# Patient Record
Sex: FEMALE | Race: WHITE
[De-identification: names, ages, dates, MRNs, and addresses within clinical notes are randomized per-mention and may not be internally consistent; named-entity substitution may affect disease eponyms.]

---

## 2020-01-06 ENCOUNTER — HOSPITAL ENCOUNTER (EMERGENCY)
Dept: HOSPITAL 62 - ER | Age: 50
Discharge: HOME | End: 2020-01-06
Payer: COMMERCIAL

## 2020-01-06 VITALS — DIASTOLIC BLOOD PRESSURE: 75 MMHG | SYSTOLIC BLOOD PRESSURE: 118 MMHG

## 2020-01-06 DIAGNOSIS — R10.814: ICD-10-CM

## 2020-01-06 DIAGNOSIS — R10.816: ICD-10-CM

## 2020-01-06 DIAGNOSIS — R10.32: ICD-10-CM

## 2020-01-06 DIAGNOSIS — Z79.899: ICD-10-CM

## 2020-01-06 DIAGNOSIS — K52.9: Primary | ICD-10-CM

## 2020-01-06 DIAGNOSIS — R10.13: ICD-10-CM

## 2020-01-06 LAB
ADD MANUAL DIFF: NO
ALBUMIN SERPL-MCNC: 4.6 G/DL (ref 3.5–5)
ALP SERPL-CCNC: 61 U/L (ref 38–126)
ANION GAP SERPL CALC-SCNC: 11 MMOL/L (ref 5–19)
APPEARANCE UR: CLEAR
APTT PPP: (no result) S
AST SERPL-CCNC: 26 U/L (ref 14–36)
BASOPHILS # BLD AUTO: 0 10^3/UL (ref 0–0.2)
BASOPHILS NFR BLD AUTO: 0.3 % (ref 0–2)
BILIRUB DIRECT SERPL-MCNC: 0.2 MG/DL (ref 0–0.4)
BILIRUB SERPL-MCNC: 0.6 MG/DL (ref 0.2–1.3)
BILIRUB UR QL STRIP: NEGATIVE
BUN SERPL-MCNC: 12 MG/DL (ref 7–20)
CALCIUM: 10.3 MG/DL (ref 8.4–10.2)
CHLORIDE SERPL-SCNC: 95 MMOL/L (ref 98–107)
CO2 SERPL-SCNC: 30 MMOL/L (ref 22–30)
EOSINOPHIL # BLD AUTO: 0 10^3/UL (ref 0–0.6)
EOSINOPHIL NFR BLD AUTO: 0.4 % (ref 0–6)
ERYTHROCYTE [DISTWIDTH] IN BLOOD BY AUTOMATED COUNT: 13.4 % (ref 11.5–14)
GLUCOSE SERPL-MCNC: 106 MG/DL (ref 75–110)
GLUCOSE UR STRIP-MCNC: NEGATIVE MG/DL
HCT VFR BLD CALC: 36.9 % (ref 36–47)
HGB BLD-MCNC: 12.6 G/DL (ref 12–15.5)
KETONES UR STRIP-MCNC: (no result) MG/DL
LYMPHOCYTES # BLD AUTO: 2 10^3/UL (ref 0.5–4.7)
LYMPHOCYTES NFR BLD AUTO: 21.7 % (ref 13–45)
MCH RBC QN AUTO: 30.1 PG (ref 27–33.4)
MCHC RBC AUTO-ENTMCNC: 34.2 G/DL (ref 32–36)
MCV RBC AUTO: 88 FL (ref 80–97)
MONOCYTES # BLD AUTO: 0.6 10^3/UL (ref 0.1–1.4)
MONOCYTES NFR BLD AUTO: 6.7 % (ref 3–13)
NEUTROPHILS # BLD AUTO: 6.6 10^3/UL (ref 1.7–8.2)
NEUTS SEG NFR BLD AUTO: 70.9 % (ref 42–78)
NITRITE UR QL STRIP: NEGATIVE
PH UR STRIP: 7 [PH] (ref 5–9)
PLATELET # BLD: 302 10^3/UL (ref 150–450)
POTASSIUM SERPL-SCNC: 4.3 MMOL/L (ref 3.6–5)
PROT SERPL-MCNC: 7.7 G/DL (ref 6.3–8.2)
PROT UR STRIP-MCNC: NEGATIVE MG/DL
RBC # BLD AUTO: 4.2 10^6/UL (ref 3.72–5.28)
SP GR UR STRIP: 1.01
TOTAL CELLS COUNTED % (AUTO): 100 %
UROBILINOGEN UR-MCNC: NEGATIVE MG/DL (ref ?–2)
WBC # BLD AUTO: 9.4 10^3/UL (ref 4–10.5)

## 2020-01-06 PROCEDURE — 99284 EMERGENCY DEPT VISIT MOD MDM: CPT

## 2020-01-06 PROCEDURE — 81025 URINE PREGNANCY TEST: CPT

## 2020-01-06 PROCEDURE — 36415 COLL VENOUS BLD VENIPUNCTURE: CPT

## 2020-01-06 PROCEDURE — 85025 COMPLETE CBC W/AUTO DIFF WBC: CPT

## 2020-01-06 PROCEDURE — 83690 ASSAY OF LIPASE: CPT

## 2020-01-06 PROCEDURE — 81001 URINALYSIS AUTO W/SCOPE: CPT

## 2020-01-06 PROCEDURE — 74177 CT ABD & PELVIS W/CONTRAST: CPT

## 2020-01-06 PROCEDURE — 80053 COMPREHEN METABOLIC PANEL: CPT

## 2020-01-06 NOTE — ER DOCUMENT REPORT
ED General





- General


Chief Complaint: Abdominal Pain


Stated Complaint: ABDOMINAL PAIN/BLOOD IN STOOL


Time Seen by Provider: 01/06/20 14:18


Primary Care Provider: 


LY SUAZO MD [Primary Care Provider] - Follow up as needed


TRAVEL OUTSIDE OF THE [place] IN LAST 30 DAYS: No





- HPI


Notes: 





Patient is a 49-year-old female with a history of hypertension who presents to 

the emergency department complaining of having lower abdominal pain over the 

past 5 days mostly left lower quadrant with some epigastric abdominal pain, 

decreased p.o. intake, and diarrhea.  Patient states that she has not had any 

loose stool for the past couple days, but her last episode of diarrhea did have 

red blood associated.  She is urinating normally.  No vaginal bleeding, odor, or

discharge.  Does have a history of a tubal ligation.  Denies drug allergies.  

Patient states that the pain has dulled down, but is still present.  Denies any 

headache, fever, neck pain, URI, sore throat, chest pain, palpitations, syncope,

cough, shortness of breath, wheeze, dyspnea, vomiting, urinary retention, 

dysuria, hematuria, back pain, or rash.














- Related Data


Allergies/Adverse Reactions: 


                                        





No Known Allergies Allergy (Verified 01/06/20 14:16)


   








Home Medications: Lisinopril HCTZ, Vesicare





Past Medical History





- Social History


Smoking Status: Never Smoker


Frequency of alcohol use: Occasional


Drug Abuse: None


Family History: Reviewed & Not Pertinent


Patient has suicidal ideation: No


Patient has homicidal ideation: No





Review of Systems





- Review of Systems


-: Yes All other systems reviewed and negative





Physical Exam





- Vital signs


Vitals: 


                                        











Temp Pulse Resp BP Pulse Ox


 


 98.2 F   98   18   124/80   99 


 


 01/06/20 14:24  01/06/20 14:24  01/06/20 14:24  01/06/20 14:24  01/06/20 14:24














- Notes


Notes: 





PHYSICAL EXAMINATION:





GENERAL: Well-appearing, well-nourished and in no acute distress.





HEAD: Atraumatic, normocephalic.





EYES: Pupils equal round and reactive to light, extraocular movements intact, 

sclera anicteric, conjunctiva are normal.





ENT: EAC clear b/l.  TM's intact b/l without erythema, fluid, or perforation.  

Nares patent and without discharge.  oropharynx clear without exudates.  No 

tonsilar hypertrophy or erythema.  Moist mucous membranes.  No sinus tenderness.





NECK: Normal range of motion, supple without lymphadenopathy





LUNGS: Breath sounds clear to auscultation bilaterally and equal.  No wheezes 

rales or rhonchi.





HEART: Regular rate and rhythm without murmurs, rubs, gallops.





ABDOMEN: Soft, nondistended abdomen.  No guarding, no rebound.  Normal bowel 

sounds present.  No CVA tenderness bilaterally.  + mild tenderness epigastrum 

and LLQ.  





Rectal:  there was a pinpoint area of red blood that was + on guiac. No melena 

or gross hematochezia otherwise.





Musculoskeletal: FROM to passive/active. Strength 5+/5. 





Extremities:  No cyanosis, clubbing, or edema b/l.  Peripheral pulses 2+.  

Capillary refill less than 3 seconds.





NEUROLOGICAL:  Normal speech, normal gait.  





PSYCH: Normal mood, normal affect.





SKIN: Warm, Dry, normal turgor, no rashes or lesions noted.





Course





- Re-evaluation


Re-evalutation: 





01/06/20 16:42


Patient is an afebrile, well-hydrated, 49-year-old female who presents with 

acute gastroenteritis, suspect viral.  Vitals are except without significant 

tachycardia, tachypnea, hypoxia.  PE is otherwise unremarkable.  Patient is 

nontoxic-appearing and is tolerating p.o. without difficulty.  Labs and imaging 

grossly unremarkable.  I did review the incidental finding of possible pelvic 

venous insufficiency with the patient and to follow-up with her PCM for further 

guidance.  No further work-up warranted.  Low suspicion/risk for acute 

appendicitis, bowel obstruction, acute cholecystitis, acute cholangitis, 

perforated diverticulitis, incarcerated hernia, pancreatitis, perforated ulcer, 

peritonitis, sepsis, pelvic inflammatory disease, ectopic pregnancy, tubo-

ovarian abscess, ovarian torsion, or other systemic emergent condition at this 

time.  Patient is aware that her condition can change from initial presentation 

and she needs to monitor symptoms closely and seek medical attention if any 

acute changes.  I did review that she may need to have a colonoscopy to further 

evaluate for the tiny bit of red blood on ELISE, most likely benign but can be 

cancerous etiology.  Conservative measures otherwise for symptoms.  Recheck with

your PCM in 2-3 days.  Schedule consult with a gastroenterologist.  Return to 

the ED with any worsening/concerning symptoms otherwise as reviewed in 

discharge.  Patient is in agreement.





- Vital Signs


Vital signs: 


                                        











Temp Pulse Resp BP Pulse Ox


 


 98.2 F   98   18   124/80   99 


 


 01/06/20 14:24  01/06/20 14:24  01/06/20 14:24  01/06/20 14:24  01/06/20 14:24














- Laboratory


Result Diagrams: 


                                 01/06/20 14:30





                                 01/06/20 14:30


Laboratory results interpreted by me: 


                                        











  01/06/20 01/06/20





  14:30 14:30


 


Sodium  136.4 L 


 


Chloride  95 L 


 


Calcium  10.3 H 


 


Urine Ketones   TRACE H














Discharge





- Discharge


Clinical Impression: 


 Gastroenteritis





Condition: Stable


Disposition: HOME, SELF-CARE


Instructions:  Gastroenteritis (adult) (Pending sale to [facility])


Additional Instructions: 


As reviewed, the blood in your stools most likely from of benign origin, but 

worst case scenario this could be cancerous which is why you need to follow-up 

and possibly have a colonoscopy performed.





Maintain adequate fluid and food intake


Healthy diet


tylenol if needed


Monitor for any worsening symptoms


Make sure you are staying hydrated enough to urinate and have normal BM's


Recheck with your PCM in 3-5 days


Schedule an appointment with gastroenterology for further evaluation and 

management


Return to the ED with any worsening symptoms and/or development of fever, 

headache, chest pain, palpitations, syncope, shortness of breath, trouble 

breathing, abdominal pain, n/v/d, blood in stool/urine, weakness, or other 

worsening symptoms that are concerning to you.  


Referrals: 


RACHEL FLORES MD [ACTIVE STAFF] - Follow up as needed


ANNA MOTLEY MD [ACTIVE STAFF] - Follow up as needed

## 2020-01-06 NOTE — ER DOCUMENT REPORT
ED Medical Screen (RME)





- General


Chief Complaint: Abdominal Pain


Stated Complaint: ABDOMINAL PAIN/BLOOD IN STOOL


Time Seen by Provider: 01/06/20 14:18


Primary Care Provider: 


LY SUAZO MD [Primary Care Provider] - Follow up as needed


Notes: 





Patient is a 49-year-old female with a history of hypertension who presents 

emergency department with a chief complaint of abdominal pain.  Patient reports 

she has had intermittent lower abdominal pain over the past 2 weeks.  Patient 

reports this pain has become more persistent over the past 2 days.  Patient 

reports over the weekend she did have a few episodes of diarrhea.  Patient 

reports in 1 of the episodes of diarrhea she noticed bright red blood.  Patient 

has not noticed any blood since.  Patient reports initially she thought she was 

having a kidney stone but that this pain was different.  Patient denies urinary 

symptoms.  Patient denies fever.  Patient reports the pain feels like 

contractions.


TRAVEL OUTSIDE OF THE [place] IN LAST 30 DAYS: No





- Related Data


Allergies/Adverse Reactions: 


                                        





No Known Allergies Allergy (Verified 01/06/20 14:16)


   








Home Medications: Lisinopril HCTZ, Vesicare





Past Medical History





- Social History


Frequency of alcohol use: Occasional


Drug Abuse: None





Physical Exam





- Abdominal


Inspection: Normal


Distension: No distension


Bowel sounds: Normal


Tenderness: Tender - Suprapubic tenderness with palpation.


Organomegaly: No organomegaly





Course





- Re-evaluation


Re-evalutation: 





01/06/20 14:26


Patient will require a thorough abdominal exam while on a stretcher.





I have greeted and performed a rapid initial assessment of this patient.  A 

comprehensive ED assessment and evaluation of the patient, analysis of test 

results and completion of the medical decision making process will be conducted 

by additional ED providers.





Doctor's Discharge





- Discharge


Referrals: 


LY SUAZO MD [Primary Care Provider] - Follow up as needed